# Patient Record
(demographics unavailable — no encounter records)

---

## 2024-10-15 NOTE — HISTORY OF PRESENT ILLNESS
[de-identified] : Patient is here for a follow up appointment for the left wrist. Patient states pain has not improved since the previous visit. Patient notes pain is most prevalent when bending the wrist backwards. Patient continues to wear brace for support.

## 2024-10-15 NOTE — PHYSICAL EXAM
[] : good capillary refill in all fingers [Left] : left wrist [de-identified] : improving tenderness over scaphoid fracture [FreeTextEntry9] : deferred due to fx status [de-identified] : deferred due to fx status [FreeTextEntry8] : xray of the L wrist revealed evidence of nondisplaced scaphoid fx in good alignment with no evidence of nonunion

## 2024-10-15 NOTE — DISCUSSION/SUMMARY
[de-identified] : The patient's condition is acute Confounding medical conditions/concerns:  N/A Tests/Studies Independently Interpreted Today:                                 xray of the L wrist revealed evidence of nondisplaced scaphoid fx in good alignment with no evidence of nonunion ------------------------------------------------------------------------------------------------------------------  We  discussed treatment options, both operative and non-operative for pt's scaphoid fx considering she is just shy of a month from the injury  The fracture seems to be in good alignment and there is no evidence of nonunion but would rec use of thumbspika brace x2 weeks considering this type of fx are notorious for  poor healing.  Discussed risk of nonunion and need for ORIF  Will plan for updated MRI of the L wrist  at the end of next week to eval healing - dispensed Rx in office  f/u in 2 weeks    I, Padmaja Greene, attest that this documentation has been prepared under the direction and in the presence of Provider Dr. Danial Renee

## 2024-10-28 NOTE — HISTORY OF PRESENT ILLNESS
[6] : 6 [2] : 2 [Dull/Aching] : dull/aching [de-identified] : She fell 4-6 weeks ago L wrist pain   Recent MRI shows progression scaphoid marrow edema  She is wearing Exos brace  [FreeTextEntry1] : L wrist [FreeTextEntry3] : 9/20/24 [FreeTextEntry5] :  after falling and bent wrist while landing. Swelling. Pain is lateral. Denies n/t. Went to [facility] ER, got XR - negative for fracture.  saw Dr. Renee-xr,mri

## 2024-10-28 NOTE — PHYSICAL EXAM
[de-identified] : L wist No swelling Min scaphoid tenderness Stiffness NVI  Xrays nonhealing fracture   MRI shows nonhealing fracture

## 2024-10-28 NOTE — ASSESSMENT
[FreeTextEntry1] : I rec bone stim Cont with Exos  We discussed ORIF today  Return in 4 weeks- Xrays upon return

## 2024-11-25 NOTE — HISTORY OF PRESENT ILLNESS
[4] : 4 [2] : 2 [Dull/Aching] : dull/aching [de-identified] : L wrist scaphoid fracture  She is feeling better  2+ months  [FreeTextEntry1] : L wrist  [FreeTextEntry5] : no changes since last visit. [de-identified] : exos

## 2024-11-25 NOTE — ASSESSMENT
[FreeTextEntry1] : Therapy Activity modification as tolerated Ice as needed NSAIDs as needed Return prn No